# Patient Record
(demographics unavailable — no encounter records)

---

## 2025-01-21 NOTE — HISTORY OF PRESENT ILLNESS
[FreeTextEntry1] : Pt comes for annual exam . she has no complains . Had breast biopsy and the result was negative for cancer . Not sure what was found

## 2025-02-05 NOTE — HISTORY OF PRESENT ILLNESS
[FreeTextEntry1] : pt comes for discussion of the pap smear . She currently has minimal discharge . No odor

## 2025-02-05 NOTE — REASON FOR VISIT
This writer sent Bupropion XL script for 90 day supply back to the pharmacy as denied by provider.   [Follow-Up] : a follow-up evaluation of